# Patient Record
(demographics unavailable — no encounter records)

---
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
## 2024-10-22 NOTE — HEALTH RISK ASSESSMENT
[No] : In the past 12 months have you used drugs other than those required for medical reasons? No [0] : 2) Feeling down, depressed, or hopeless: Not at all (0) [PHQ-2 Negative - No further assessment needed] : PHQ-2 Negative - No further assessment needed [Time Spent: ___ Minutes] : I spent [unfilled] minutes performing a depression screening for this patient. [Patient reported mammogram was normal] : Patient reported mammogram was normal [Never] : Never [Patient reported colonoscopy was normal] : Patient reported colonoscopy was normal [HIV Test offered] : HIV Test offered [Hepatitis C test offered] : Hepatitis C test offered [With Family] : lives with family [Employed] : employed [] :  [# Of Children ___] : has [unfilled] children [Fully functional (bathing, dressing, toileting, transferring, walking, feeding)] : Fully functional (bathing, dressing, toileting, transferring, walking, feeding) [Fully functional (using the telephone, shopping, preparing meals, housekeeping, doing laundry, using] : Fully functional and needs no help or supervision to perform IADLs (using the telephone, shopping, preparing meals, housekeeping, doing laundry, using transportation, managing medications and managing finances) [Reports changes in vision] : Reports changes in vision [Reports changes in dental health] : Reports changes in dental health [NO] : No [de-identified] : weights, mixed cardio  [de-identified] : fine  [WBN3Liums] : 0 [Reports changes in hearing] : Reports no changes in hearing [MammogramDate] : 2023 [ColonoscopyDate] : 2023 [ColonoscopyComments] : due 2030 or 2033  [de-identified] : 

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
## 2024-10-22 NOTE — REVIEW OF SYSTEMS
[Joint Pain] : joint pain [Negative] : Heme/Lymph [Earache] : no earache [Hearing Loss] : no hearing loss [Nasal Discharge] : no nasal discharge [FreeTextEntry4] : ear itchiness
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
## 2024-11-22 NOTE — HISTORY OF PRESENT ILLNESS
[FreeTextEntry1] : 69F w/ pmhx of HLD and SBO p/f lightheadedness and palpitations. The new are not associated with each other. The lightheadedness has been occurring for 2 months now but has significantly decreased in frequency over the past month. She feels off balance all of a sudden during these episodes and primarily occur when walking. There has been no associated chest pain, palpitations or LOC. She feels she may have been dehydrated during the month of increased frequency but denies any diarrhea, vomiting or weight loss.  The patient has separately occurring palpitations that started 2 years ago with one severe episode then had no recurrences up until 2 weeks ago. These occur at random but primarily more during rest and sometimes with changing of position. So associated chest pain or dizziness with the palpitations. Pt admits to snoring and morning fatigue and non-restful sleep. After last visit, sx improved, but have again recurred w short runs of tachycardia, no dizziness  Advised last visit to resume statin, but did not due to prior short term memory loss naty yousif

## 2024-11-22 NOTE — ASSESSMENT
[FreeTextEntry1] : EKG NSR nl  A/P 69F w/ pmhx of HLD and SBO p/f lightheadedness and palpitations  #Lightheadedness Low s/f cardiac etiology. However, given AS murmur on exam will r/o structural etiology - ECHO - nl  #Palpitations - Ziopatch x 5 days - SVT longest 10 beats, SVT noted w symptoms, rare PVcs - Sleep study - mild MARCELINA  - TSH nl  No meds for now   #HLD Pt self-discontinued statins after short term memory issues - Lipid panel -  - Apolipoprotein B levels - 112 Last visit advised resume statin - sh has not again due to short term memory For now, try zetia 10 reassess labs 2 mos  consider calcium score then to dtermine whether addl meds needed beyond zetia
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
## 2025-02-12 NOTE — PHYSICAL EXAM
[No Acute Distress] : no acute distress [Well-Appearing] : well-appearing [Clear to Auscultation] : lungs were clear to auscultation bilaterally [Normal Rate] : normal rate  [Regular Rhythm] : with a regular rhythm [No Focal Deficits] : no focal deficits [Normal Affect] : the affect was normal [Normal Insight/Judgement] : insight and judgment were intact [de-identified] : L elbow - no edema, erythema, pain at medial epicondyl with pronation and supination  [de-identified] :  hair loss at midscalp and vertex w/some minor growth

## 2025-02-12 NOTE — PHYSICAL EXAM
[No Acute Distress] : no acute distress [Well-Appearing] : well-appearing [Clear to Auscultation] : lungs were clear to auscultation bilaterally [Normal Rate] : normal rate  [Regular Rhythm] : with a regular rhythm [No Focal Deficits] : no focal deficits [Normal Affect] : the affect was normal [Normal Insight/Judgement] : insight and judgment were intact [de-identified] : L elbow - no edema, erythema, pain at medial epicondyl with pronation and supination  [de-identified] :  hair loss at midscalp and vertex w/some minor growth
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
## 2025-02-12 NOTE — REVIEW OF SYSTEMS
[Poor Libido] : poor libido [Hair Changes] : hair changes [Negative] : Heme/Lymph [Joint Pain] : joint pain

## 2025-02-13 NOTE — HISTORY OF PRESENT ILLNESS
[FreeTextEntry1] : NPA - Hair Loss  [de-identified] : Dorina Antoine 70y/o F presents for hair loss and spot on right temple.   -hair loss starting from crown. x 10 years. Has progressed. Was inisitally itchy and scaly , not anymore.  Mom and daughter has similar hair loss.  Used to chemically relax her hair but now keeps it shaved since it has thinned so much.  Uses a color rinse occasionally, does nothing else to hair.   -dark spot on temple, started as scab. 3 weeks ago.     PHx of skin cancer: No  FHx of skin cancer: No  H/x of blistering sunburns: No  H/x of tanning bed use: No  Uses sunscreen regularly: No

## 2025-02-13 NOTE — PHYSICAL EXAM
[FreeTextEntry3] : vertex scalp : almost confluent hair loss with absence of follicular ostia.  at the periphery, there are terminal hairs with perifollicular white and scaling.   60% hair loss  R temple: hyperpigmented macule , benign dermoscopy pattern

## 2025-02-13 NOTE — HISTORY OF PRESENT ILLNESS
[FreeTextEntry1] : NPA - Hair Loss  [de-identified] : Dorina Antoine 70y/o F presents for hair loss and spot on right temple.   -hair loss starting from crown. x 10 years. Has progressed. Was inisitally itchy and scaly , not anymore.  Mom and daughter has similar hair loss.  Used to chemically relax her hair but now keeps it shaved since it has thinned so much.  Uses a color rinse occasionally, does nothing else to hair.   -dark spot on temple, started as scab. 3 weeks ago.     PHx of skin cancer: No  FHx of skin cancer: No  H/x of blistering sunburns: No  H/x of tanning bed use: No  Uses sunscreen regularly: No

## 2025-02-13 NOTE — ASSESSMENT
[FreeTextEntry1] : #Concern for scarring alopecia - 60% loss Favor CCCA. ddx LPP, seb derm, DLE  - Biopsy by 4mm Punch Technique Procedure Note.   Location: R vertex scalp.  After discussion of risks, verbal consent was obtained and a time out was performed.  The area was cleaned with an alcohol swab.  Anesthesia: 1% lidocaine with 1:100,000 epinephrine.  Closure with 4-0 prolene interrupted suture.  Specimen was sent for pathologic examination.  Wound care was reviewed with the patient.  Will contact patient with biopsy results.  #Hyperpigmented macule Favor PIH based on history. reassured pt.  continue to monitor  RTC 2 week suture removal and review results

## 2025-03-02 NOTE — PHYSICAL EXAM
[FreeTextEntry3] : vertex scalp : almost confluent hair loss with absence of follicular ostia.  at the periphery, there are terminal hairs with perifollicular white and scaling.   60% hair loss

## 2025-03-02 NOTE — ASSESSMENT
[FreeTextEntry1] : #CCCA  - 60% loss 2/13/25 Punch bx R vertex scalp:  consistent with CCCA  I reviewed diagnosis of CCCA with patient today.  CCCA is a scaring alopecia predominantly affecting middle aged woman of  descent with multifactorial etiology although thought to be due to a genetic defect of the inner root sheath. Traumatic hair practices may trigger or aggravate this disease. These include: cornrow braiding (with and without extensions), weaves (with sewn-in or glued-on hair), use of hot chau, and frequent use of hair relaxers Early diagnosis and treatment is important in order to prevent progression of disease. In areas where scarring has occurred with follicular destruction, hair re-growth is not possible; but in other areas where inflammation can be controlled, the goal would be to halt the disease and prevent further hair loss.   We discussed options for management which would include use of a high potency topical steroid, as well as potential options of a systemic agent.   Treatment: -Continue discontinuation all traumatic hair practices - start Rogaine 5% (minoxidil) solution/foam -start Doxycycline 100mg BID (usually 2-6 months) -start fluocinonide .05 solution bid for 2 weeks on and 2 weeks off continuously -ILK recommended, she declines at this time bc wants to avoid shots. Will try above regimen first with close clinical follow up. -suture removed today in clinic without issues -follow up in 6-8 weeks

## 2025-03-02 NOTE — HISTORY OF PRESENT ILLNESS
[FreeTextEntry1] : RPA - Review biopsy results, suture removal [de-identified] : Dorina Antoine 68 y/o F presents for the above.   ----------- LV 02/13/25 Dorina Antoine 68y/o F presents for hair loss and spot on right temple.   -hair loss starting from crown. x 10 years. Has progressed. Was inisitally itchy and scaly , not anymore.  Mom and daughter has similar hair loss.  Used to chemically relax her hair but now keeps it shaved since it has thinned so much.  Uses a color rinse occasionally, does nothing else to hair.   -dark spot on temple, started as scab. 3 weeks ago.     PHx of skin cancer: No  FHx of skin cancer: No  H/x of blistering sunburns: No  H/x of tanning bed use: No  Uses sunscreen regularly: No

## 2025-03-02 NOTE — HISTORY OF PRESENT ILLNESS
[FreeTextEntry1] : RPA - Review biopsy results, suture removal [de-identified] : Dorina Antoine 68 y/o F presents for the above.   ----------- LV 02/13/25 Dorina Antoine 68y/o F presents for hair loss and spot on right temple.   -hair loss starting from crown. x 10 years. Has progressed. Was inisitally itchy and scaly , not anymore.  Mom and daughter has similar hair loss.  Used to chemically relax her hair but now keeps it shaved since it has thinned so much.  Uses a color rinse occasionally, does nothing else to hair.   -dark spot on temple, started as scab. 3 weeks ago.     PHx of skin cancer: No  FHx of skin cancer: No  H/x of blistering sunburns: No  H/x of tanning bed use: No  Uses sunscreen regularly: No

## 2025-03-07 NOTE — HISTORY OF PRESENT ILLNESS
[de-identified] : She thinks that there's a part from her bluetooth earbud stuck in her L ear. Her hearing seems muffled; new L sided brief periods of hissing tinnitus over the last 5 days. Strong FHx hearing loss

## 2025-03-07 NOTE — PHYSICAL EXAM
[Binocular Microscopic Exam] : Binocular microscopic exam was performed [Rinne Test Air Conduction Persists > Bone Conduction Right] : air conduction greater than bone conduction on the right [Rinne Test Air Conduction Persists > Bone Conduction Left] : air conduction greater than bone conduction on the left [Rosado Test Lateralizes To Left] : tone lateralization to the left [Normal] : no masses and lesions seen, face is symmetric

## 2025-03-07 NOTE — DATA REVIEWED
[de-identified] : Today: testing was deferred to a scheduled appointment [de-identified] : 6/24 HSAT: AHI 2.5/7.8, LSat 89%

## 2025-03-07 NOTE — ASSESSMENT
[FreeTextEntry1] : We discussed a possible sudden loss, though her Rosado lateralizes to the problem ear; RTC for a

## 2025-03-10 NOTE — PHYSICAL EXAM
[Normal] : the left middle ear was normal [Rinne Test Air Conduction Persists > Bone Conduction Right] : air conduction greater than bone conduction on the right [Rinne Test Air Conduction Persists > Bone Conduction Left] : air conduction greater than bone conduction on the left [Rosado Test Lateralizes To Left] : tone lateralization to the left

## 2025-03-10 NOTE — DATA REVIEWED
[de-identified] : 3/25: R nl hearing; L nl to mod SNHL;  AU - Immitance testing w/ type A AU [de-identified] : 6/24 HSAT: AHI 2.5/7.8, LSat 89%
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
## 2025-04-05 NOTE — DATA REVIEWED
[de-identified] : Audiogram personally reviewed and interpreted and my findings are as follows (3/10/25): Right: SRT 15dB; %; Type A tymp; normal Left: SRT 15dB; %; Type A tymp; normal with moderate 3-4khz SNHL notch [de-identified] : MRI IAC w/wo contrast 3/11/25: tiny nodular focus of enhancement in the left internal auditory canal could represent a small schwannoma.

## 2025-04-05 NOTE — ASSESSMENT
[FreeTextEntry1] : Vestibular Schwannoma - LEFT - possible 1-2mm left IAC schwannoma - 1 chronic illness that poses a threat to bodily function (hearing, balance, and facial nerve) - Dr. Negro's note reviewed - audiogram personally interpreted and reviewed with patient - MRI IAC w/wo contrast 3/11/25 reviewed with patient - new MRI IAC w/wo contrast for December 2025/January 2026 ordered - decision to not perform elective major surgery with identified procedure risk factors (hearing loss, imbalance, vertigo, meningitis, CSF leak, death) - discussed that the patient is expected to gradually lose hearing in the right ear even with or without surgical intervention - discussed the various surgical approaches including translabyrinthine, retrosigmoid, and middle fossa approach- discussed the risks and benefits of each approach - discussed gamma knife radiation as a treatment option along with its risks and benefits - will continue observation for now - repeat MRI IAC w contrast in 9 months - f/u after MRI performed

## 2025-04-10 NOTE — HISTORY OF PRESENT ILLNESS
[FreeTextEntry1] : RPA - Hair loss [de-identified] : Dorina Antoine 68y/o F presents for F/U of hair loss. - No changes since LV . shaved hair recently ----------------------- LV:3/2/2025 Dorina Antoine 68 y/o F presents for the above.  ----------- LV 02/13/25 Dorina Antoine 68y/o F presents for hair loss and spot on right temple.   -hair loss starting from crown. x 10 years. Has progressed. Was inisitally itchy and scaly , not anymore.  Mom and daughter has similar hair loss.  Used to chemically relax her hair but now keeps it shaved since it has thinned so much.  Uses a color rinse occasionally, does nothing else to hair.   -dark spot on temple, started as scab. 3 weeks ago.     PHx of skin cancer: No  FHx of skin cancer: No  H/x of blistering sunburns: No  H/x of tanning bed use: No  Uses sunscreen regularly: No

## 2025-04-10 NOTE — ASSESSMENT
[FreeTextEntry1] : #CCCA  - 60% loss 2/13/25 Punch bx R vertex scalp:  consistent with CCCA  I reviewed diagnosis of CCCA with patient .  CCCA is a scaring alopecia predominantly affecting middle aged woman of  descent with multifactorial etiology although thought to be due to a genetic defect of the inner root sheath. Traumatic hair practices may trigger or aggravate this disease. These include: cornrow braiding (with and without extensions), weaves (with sewn-in or glued-on hair), use of hot chau, and frequent use of hair relaxers Early diagnosis and treatment is important in order to prevent progression of disease. In areas where scarring has occurred with follicular destruction, hair re-growth is not possible; but in other areas where inflammation can be controlled, the goal would be to halt the disease and prevent further hair loss We discussed options for management which would include use of a high potency topical steroid, as well as potential options of a systemic agent.   Treatment: -Continue discontinuation all traumatic hair practices - continue Rogaine 5% (minoxidil) solution/foam (3/2025- p) - continue Doxycycline 100mg BID (usually 2-6 months) (3/2025-p) - continue fluocinonide .05 solution bid for 2 weeks on and 2 weeks off continuously -ILK #1 today   Intralesional Kenalog- Procedure Note Verbal consent was obtained from the patient. The risks of bleeding, infection, atrophy, and hypopigmentation were reviewed with the patient. 30  lesion located on the vertex, frontal, occipital scalp was injected with 2.5-5 mg/cc of Kenalog, for a total volume of 3.0 cc. Kenalog 10: [id number] Exp:April 2027  follow up in 6-8 weeks

## 2025-04-10 NOTE — ASSESSMENT
[FreeTextEntry1] : #CCCA  - 60% loss 2/13/25 Punch bx R vertex scalp:  consistent with CCCA  I reviewed diagnosis of CCCA with patient .  CCCA is a scaring alopecia predominantly affecting middle aged woman of  descent with multifactorial etiology although thought to be due to a genetic defect of the inner root sheath. Traumatic hair practices may trigger or aggravate this disease. These include: cornrow braiding (with and without extensions), weaves (with sewn-in or glued-on hair), use of hot chau, and frequent use of hair relaxers Early diagnosis and treatment is important in order to prevent progression of disease. In areas where scarring has occurred with follicular destruction, hair re-growth is not possible; but in other areas where inflammation can be controlled, the goal would be to halt the disease and prevent further hair loss We discussed options for management which would include use of a high potency topical steroid, as well as potential options of a systemic agent.   Treatment: -Continue discontinuation all traumatic hair practices - continue Rogaine 5% (minoxidil) solution/foam (3/2025- p) - continue Doxycycline 100mg BID (usually 2-6 months) (3/2025-p) - continue fluocinonide .05 solution bid for 2 weeks on and 2 weeks off continuously -ILK #1 today   Intralesional Kenalog- Procedure Note Verbal consent was obtained from the patient. The risks of bleeding, infection, atrophy, and hypopigmentation were reviewed with the patient. 30  lesion located on the vertex, frontal, occipital scalp was injected with 2.5-5 mg/cc of Kenalog, for a total volume of 3.0 cc. Kenalog 10: [id number] Exp:April 2027  follow up in 6-8 weeks

## 2025-04-10 NOTE — HISTORY OF PRESENT ILLNESS
[FreeTextEntry1] : RPA - Hair loss [de-identified] : Dorina Antoine 70y/o F presents for F/U of hair loss. - No changes since LV . shaved hair recently ----------------------- LV:3/2/2025 Dorina Antoine 68 y/o F presents for the above.  ----------- LV 02/13/25 Dorina Antoine 70y/o F presents for hair loss and spot on right temple.   -hair loss starting from crown. x 10 years. Has progressed. Was inisitally itchy and scaly , not anymore.  Mom and daughter has similar hair loss.  Used to chemically relax her hair but now keeps it shaved since it has thinned so much.  Uses a color rinse occasionally, does nothing else to hair.   -dark spot on temple, started as scab. 3 weeks ago.     PHx of skin cancer: No  FHx of skin cancer: No  H/x of blistering sunburns: No  H/x of tanning bed use: No  Uses sunscreen regularly: No

## 2025-05-20 NOTE — PHYSICAL EXAM
[No Acute Distress] : no acute distress [Well-Appearing] : well-appearing [Clear to Auscultation] : lungs were clear to auscultation bilaterally [Normal Rate] : normal rate  [Regular Rhythm] : with a regular rhythm [No Focal Deficits] : no focal deficits [Normal Affect] : the affect was normal [Normal Insight/Judgement] : insight and judgment were intact [de-identified] : noted hair grown on scalp
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
## 2025-05-20 NOTE — REVIEW OF SYSTEMS
[Hair Changes] : hair changes [Negative] : Heme/Lymph [de-identified] : abnormal muscle movements
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
## 2025-05-28 NOTE — HISTORY OF PRESENT ILLNESS
[FreeTextEntry1] : RPA - CCCA [de-identified] : Dorina Antoine 68 y/o F presents for F/U of CCCA. - has noticed improvement. thinks hair looks benitez and new hairs growing in on vertex scalp. - took doxy until 1 week ago. No issues. just did not continue. Also stopped topical minoxidil because it was causing itchy bumps on scalp.  _________ LV 04/09/25 Dorina Antoine 70y/o F presents for F/U of hair loss. - No changes since LV . shaved hair recently ----------------------- LV:3/2/2025 Brendarosalba Antoine 68 y/o F presents for the above.  ----------- LV 02/13/25 Dorina Antoine 70y/o F presents for hair loss and spot on right temple.   -hair loss starting from crown. x 10 years. Has progressed. Was inisitally itchy and scaly , not anymore.  Mom and daughter has similar hair loss.  Used to chemically relax her hair but now keeps it shaved since it has thinned so much.  Uses a color rinse occasionally, does nothing else to hair.   -dark spot on temple, started as scab. 3 weeks ago.     PHx of skin cancer: No  FHx of skin cancer: No  H/x of blistering sunburns: No  H/x of tanning bed use: No  Uses sunscreen regularly: No

## 2025-05-28 NOTE — PHYSICAL EXAM
[FreeTextEntry3] : vertex scalp : almost confluent hair loss with absence of follicular ostia.  at the periphery, there are terminal hairs with perifollicular white and scaling. less scaling and new regrowth noted today   60% hair loss

## 2025-05-28 NOTE — ASSESSMENT
[FreeTextEntry1] : #CCCA  - 60% loss 2/13/25 Punch bx R vertex scalp:  consistent with CCCA  I reviewed diagnosis of CCCA with patient .  CCCA is a scaring alopecia predominantly affecting middle aged woman of  descent with multifactorial etiology although thought to be due to a genetic defect of the inner root sheath. Traumatic hair practices may trigger or aggravate this disease. These include: cornrow braiding (with and without extensions), weaves (with sewn-in or glued-on hair), use of hot chau, and frequent use of hair relaxers Early diagnosis and treatment is important in order to prevent progression of disease. In areas where scarring has occurred with follicular destruction, hair re-growth is not possible; but in other areas where inflammation can be controlled, the goal would be to halt the disease and prevent further hair loss We discussed options for management which would include use of a high potency topical steroid, as well as potential options of a systemic agent.   Treatment: -Continue discontinuation all traumatic hair practices - stopped Rogaine 5% (minoxidil) solution/foam 2 weeks ago due to irritation and itching. tried to restart twice but could not tolerated. given hx of cardiac murmur, will hold off on starting PO minoxidil unless clearance obtained from her cardiologist. She is interested in the prescription and will plan to schedule follow up appt with her cardiologist to discuss whether it can be an option for here.  - continue Doxycycline 100mg BID (usually 3-6 months) (3/2025-p) - continue fluocinonide .05 solution bid for 2 weeks on and 2 weeks off continuously - START finasteride 2.5mg daily, SED -ILK #2 today   Intralesional Kenalog- Procedure Note Verbal consent was obtained from the patient. The risks of bleeding, infection, atrophy, and hypopigmentation were reviewed with the patient. 20  lesion located on the vertex, frontal, occipital scalp was injected with 5 mg/cc of Kenalog, for a total volume of 2.0 cc. Kenalog 10: [id number] Exp:April 2027  follow up 8 weeks or sooner prn